# Patient Record
(demographics unavailable — no encounter records)

---

## 2025-05-09 NOTE — REASON FOR VISIT
[Family Member] : family member [de-identified] : Cerebral Angiogram [de-identified] : 4/29/2025

## 2025-05-09 NOTE — END OF VISIT
[FreeTextEntry3] : I personally reviewed this lady today in the neurosurgery clinic together with her family.  Today we went over the options for management of her nonruptured left-sided A1 aneurysm that is large and presents a significant risk of rupture over her lifetime.  This is likely an incidental finding and not related to her head injury.  We discussed the results of the catheter angiogram and options for treatment.  She wishes to proceed with treatment and we discussed open microsurgery versus endovascular treatment.  She wishes to proceed with endovascular management of the aneurysm.  We discussed options for the endovascular management of the aneurysm including flow diversion, coiling, web device or combination of these.  Ultimately, we believe that flow diversion together with an intra saccular therapy is the most likely long-term durable result and we will proceed with this.  Because of her recent head injury we will delay initiating anticoagulation for a week or so and she is aware of the risks and benefits of all approaches to treatment and wishes to proceed.  All questions were answered to her and her family satisfaction.

## 2025-05-09 NOTE — REASON FOR VISIT
[Family Member] : family member [de-identified] : Cerebral Angiogram [de-identified] : 4/29/2025

## 2025-05-30 NOTE — HISTORY OF PRESENT ILLNESS
[FreeTextEntry1] : Ms. KIRBY is a pleasant 71-year-old female presenting to the neurosurgery office today post endovascular procedure on 5/13/2025, pipeline flow diverting stent placement for a nonruptured left-sided A1 aneurysm.  Patient seen sooner than the usual 2-week postprocedure visit due to concerns with elevated BP. The patient's sister had contacted the office yesterday stating that the patient's BP was not well controlled. Dr. Varela had contacted her PCP, Dr. Nguyen who touched base with the patient and added Metoprolol to her medication regimen. She presents today with /90. Patient is very anxious, sister is also very anxious which does not alleviate the patient's elevated BP situation. We reminded the patient that she is stable now that the aneurysm is protected by the stent and she was reassured by this. She has been taking a diligent log of her BPs up to 6-8 times daily but was educated that she can take it 3-4 times. She should continue with the Metoprolol as prescribed, and she is scheduled to see Dr. Nguyen next Wednesday. She denies any recent headaches, aware to continue Aspirin and Brilinta as prescribed.   PLAN: c/w DAPT  CTA head/neck in 3 months  RTC after CTA  keep BP well controlled call with concerns

## 2025-05-30 NOTE — END OF VISIT
[FreeTextEntry3] :  I, Dr Varela personally performed the evaluation and management (E/M) services for this established patient who presents today with (a) new problem(s)/exacerbation of (an) existing condition(s).  That E/M includes conducting the examination, assessing all new/exacerbated conditions, and establishing a new plan of care.  Today, my ROMARIO was here to observe my evaluation and management services for this new problem/exacerbated condition to be followed going forward.

## 2025-05-30 NOTE — ASSESSMENT
[FreeTextEntry1] : 72yo F s/p pipeline flow diverting stent placement for a nonruptured left-sided A1 aneurysm on 5/13/2025. Overall doing well despite severe anxiety. BP was elevated postprocedure however her PCP implemented Metoprolol into her medication regimen and she has a follow-up with her next week.  PLAN: c/w DAPT  CTA head/neck in 3 months  RTC after CTA  keep BP well controlled with SBP no greater than 140 call with concerns   Rochelle Peterson MS, FNP-BC Nurse Practitioner [facility]   Riley Varela MD, FRCS  Director, Cerebrovascular & Endovascular Dept. Of Neurosurgery  [facility]

## 2025-05-30 NOTE — ASSESSMENT
[FreeTextEntry1] : 70yo F s/p pipeline flow diverting stent placement for a nonruptured left-sided A1 aneurysm on 5/13/2025. Overall doing well despite severe anxiety. BP was elevated postprocedure however her PCP implemented Metoprolol into her medication regimen and she has a follow-up with her next week.  PLAN: c/w DAPT  CTA head/neck in 3 months  RTC after CTA  keep BP well controlled with SBP no greater than 140 call with concerns   Rochelle Peterson MS, FNP-BC Nurse Practitioner [facility]   Riley Varela MD, FRCS  Director, Cerebrovascular & Endovascular Dept. Of Neurosurgery  [facility]